# Patient Record
Sex: FEMALE
[De-identification: names, ages, dates, MRNs, and addresses within clinical notes are randomized per-mention and may not be internally consistent; named-entity substitution may affect disease eponyms.]

---

## 2018-12-26 PROBLEM — Z00.00 ENCOUNTER FOR PREVENTIVE HEALTH EXAMINATION: Status: ACTIVE | Noted: 2018-12-26

## 2019-01-15 ENCOUNTER — APPOINTMENT (OUTPATIENT)
Dept: PULMONOLOGY | Facility: CLINIC | Age: 54
End: 2019-01-15
Payer: SELF-PAY

## 2019-01-15 VITALS
HEART RATE: 78 BPM | DIASTOLIC BLOOD PRESSURE: 70 MMHG | BODY MASS INDEX: 25.27 KG/M2 | SYSTOLIC BLOOD PRESSURE: 120 MMHG | WEIGHT: 148 LBS | OXYGEN SATURATION: 98 % | HEIGHT: 64 IN

## 2019-01-15 DIAGNOSIS — C78.00 SECONDARY MALIGNANT NEOPLASM OF UNSPECIFIED LUNG: ICD-10-CM

## 2019-01-15 PROCEDURE — 99203 OFFICE O/P NEW LOW 30 MIN: CPT

## 2019-01-15 NOTE — HISTORY OF PRESENT ILLNESS
[FreeTextEntry1] : Ms Milvia Singh is a 53 year old woman who has metastatic rectal cancer.  She has several lung nodules that were active on PET scan and have increased in size despite systemic chemotherapy.  She presented for advice on management of lung  nodules and her treatment in general.  She is under the care of an oncologist at [facility] and is considering into a clinical trail.  She has no dyspnea, cough or chest pain.\par \par In 2017 she was found to have an obstructing rectal lesion cw adenocarcinoma.  she underwent resection but postoperative course was remarkable for intestinal leak.  she developed sepsis, acute respiratory failure, ARDS.  She required tracheostomy and was eventually weaned and decannulated.  She has received chemotherapy with Xeliri, bevacizumab.  The lung nodules appeared to increase in size. , about 6 mm.

## 2019-01-15 NOTE — DISCUSSION/SUMMARY
[FreeTextEntry1] : Rectal cancer with metastases to lung, liver bladder.\par \par She requires systemic treatment with chemotherapy and  or immunotherapy.  She will follow with her oncologist at [facility].\par \par She presented with her daughters and sister.\par \par I answered several questions regarding potential treatment approaches\par \par I concur with systemic treatment of her condition\par \par CT and PET scan should be followed\par \par i reviewed clinical history and radiology reports \par \par Kit Guzman MD [facility]  No pertinent past medical history

## 2019-01-15 NOTE — PHYSICAL EXAM
[General Appearance - Well Nourished] : well nourished [General Appearance - In No Acute Distress] : no acute distress [Normal Oropharynx] : normal oropharynx [Low Lying Soft Palate] : no low lying soft palate [Heart Sounds] : normal S1 and S2 [Arterial Pulses Normal] : the arterial pulses were normal [Respiration, Rhythm And Depth] : normal respiratory rhythm and effort [Exaggerated Use Of Accessory Muscles For Inspiration] : no accessory muscle use [Auscultation Breath Sounds / Voice Sounds] : lungs were clear to auscultation bilaterally [Bowel Sounds] : normal bowel sounds [Abdomen Soft] : soft [Abdomen Tenderness] : non-tender [] : no hepato-splenomegaly [Nail Clubbing] : no clubbing of the fingernails

## 2019-01-28 ENCOUNTER — APPOINTMENT (OUTPATIENT)
Dept: SURGERY | Facility: CLINIC | Age: 54
End: 2019-01-28
Payer: MEDICAID

## 2019-01-28 VITALS
OXYGEN SATURATION: 99 % | BODY MASS INDEX: 23.32 KG/M2 | WEIGHT: 140 LBS | HEART RATE: 68 BPM | HEIGHT: 64.96 IN | SYSTOLIC BLOOD PRESSURE: 110 MMHG | DIASTOLIC BLOOD PRESSURE: 75 MMHG | TEMPERATURE: 98.7 F | RESPIRATION RATE: 16 BRPM

## 2019-01-28 DIAGNOSIS — C78.00 MALIGNANT NEOPLASM OF RECTUM: ICD-10-CM

## 2019-01-28 DIAGNOSIS — Z87.42 PERSONAL HISTORY OF OTHER DISEASES OF THE FEMALE GENITAL TRACT: ICD-10-CM

## 2019-01-28 DIAGNOSIS — Z87.19 PERSONAL HISTORY OF OTHER DISEASES OF THE DIGESTIVE SYSTEM: ICD-10-CM

## 2019-01-28 DIAGNOSIS — Z87.09 PERSONAL HISTORY OF OTHER DISEASES OF THE RESPIRATORY SYSTEM: ICD-10-CM

## 2019-01-28 DIAGNOSIS — Z93.3 COLOSTOMY STATUS: ICD-10-CM

## 2019-01-28 DIAGNOSIS — F17.200 NICOTINE DEPENDENCE, UNSPECIFIED, UNCOMPLICATED: ICD-10-CM

## 2019-01-28 DIAGNOSIS — N13.30 UNSPECIFIED HYDRONEPHROSIS: ICD-10-CM

## 2019-01-28 DIAGNOSIS — Z87.01 PERSONAL HISTORY OF PNEUMONIA (RECURRENT): ICD-10-CM

## 2019-01-28 DIAGNOSIS — K94.19 OTHER COMPLICATIONS OF ENTEROSTOMY: ICD-10-CM

## 2019-01-28 DIAGNOSIS — C20 MALIGNANT NEOPLASM OF RECTUM: ICD-10-CM

## 2019-01-28 PROCEDURE — 99205 OFFICE O/P NEW HI 60 MIN: CPT

## 2019-01-28 RX ORDER — THYROID 90 MG/1
TABLET ORAL
Refills: 0 | Status: ACTIVE | COMMUNITY

## 2019-02-03 PROBLEM — K94.19: Status: ACTIVE | Noted: 2019-02-03

## 2019-02-03 PROBLEM — C20 RECTAL CANCER METASTASIZED TO LUNG: Status: ACTIVE | Noted: 2019-02-03

## 2019-02-03 PROBLEM — N13.30 HYDRONEPHROSIS, RIGHT: Status: ACTIVE | Noted: 2019-02-03

## 2019-02-03 PROBLEM — Z93.3 COLOSTOMY IN PLACE: Status: ACTIVE | Noted: 2019-01-28

## 2019-02-03 NOTE — PHYSICAL EXAM
[FreeTextEntry1] : This is a 53 year-old well-developed female in no apparent distress.\par \par HEENT normocephalic, anicteric, external ears normal bilaterally, EOMs intact.\par \par Lungs - clear to auscultation bilaterally\par \par Cardiac - regular rate and rhythm.\par \par Abdomen soft, nontender, nondistended. RLQ diverting loop ileostomy with prolapse of both limbs, proximal limb with 4 cm prolapse, pt reports prolapse gets significantly longer at times. LLQ colostomy in place, not functional, flat against the skin. Pt is wearing stoma appliances at both sides. No hepatosplenomegaly.\par \par No inguinal lymphadenopathy bilaterally.\par \par Neuro-cranial nerves grossly intact. Normal gait.\par \par Psychiatric-oriented to time place and person. Good understanding of conversation.\par

## 2019-02-03 NOTE — ASSESSMENT
[FreeTextEntry1] : 52 yo female with a prolapsing diverting loop ileostomy and an end colostomy. Given the significant prolapse of the diverting loop ileostomy, it is reasonable to reverse it. I discussed the details, risks, benefits and alternatives of the procedure and expectations of recovery. I discussed she would need to wait 4 weeks postop prior to starting chemotherapy, as long as there were no complications. \par The pt has not had an evaluation of her colon since her LAR in 2017. Furthermore, it appears she has never had a full colonoscopy, as the reports state the rectal Ca could not be traversed with the scope at the time of her dx. She will need to have evaluation of her colon prior to reversing the diverting loop ileostomy, to ensure there are no distal strictures and no colon lesions. Unfortunately prepping the diverted colon is challenging. One option is  a barium enema with fleet enema prep via the colostomy. The barium enema would be limited and target large lesions. Another option would be to admit the pt and perform bowel prep via a catheter in the distal limb of the diverting loop ileostomy, then perform a colonoscopy prior to the ileostomy reversal. \par The pt inquired about reversal of the colostomy. I do not feel this will ever be possible, due to a very low rectal stump and a hostile pelvis. To elaborate, the surgeon's report states she had an anastomosis at 3 cm from the dentate line. 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 It is also noteworthy that this pt did not receive the "standard" tx for locally advanced rectal Ca, which would have been neoadjuvant chemoRT, then resection and then further chemotherapy (a diverting colostomy could have been done pre-treatment if there was concern for clinical obstruction). She never received pelvic radiation. Therefore, I am concerned about the possibility of local pelvic recurrence - this may already be present in her pelvis but the imaging and w/u so far have not confirmed it. This is another reason however that I would not recommend colostomy reversal. \par I discussed that the timing of the ileostomy reversal would depend on her need for chemotherapy. I will reach out to the Medical Oncologist, Dr Sher, to discuss. If she feels the pt can wait to restart chemo until after recovery from the reversal, I will schedule the pt for surgery asap.\par The pt is satisfied with this plan. I will call her after I discuss her case with Dr Sher.

## 2019-02-03 NOTE — HISTORY OF PRESENT ILLNESS
[FreeTextEntry1] : Milvia is a 54 y/o female here for a consultation visit for ostomy reversal. \par Patient lives in [facility] and has received all her tx so far there. Has hx of rectal adenocarcinoma, diagnosed on colonoscopy in 2017, at the time the lesion was near obstructing and could not be traversed with the scope. Pt underwent LAR + MARGARITO+BSO in March 2017, path was T3N1 1/22) adenocarcinoma of the rectum. The uterus had been fused to the rectum at the area of the tumor, but Path was read as endometriosis. \par Postoperatively the pt developed an anastomotic leak, initially treated with a diverting loop ileostomy. The pt remained septic and underwent 2nd emergency exploration and creation of a left colostomy. Developed Acinetobacter pneumonia and ARDS. Eventually she recovered and her presumed lung metastasis has been treated with 8 cycles of XELIRI chemo and Avastin. F/u imaging showed a right sided hydronephrosis d/t ureter stricture at the level of the pelvis and a 6 cm fluid collection, this was treated with a pigtail catheter, aspiration of the fluid did not show malignancy. However, the fluid collection was positive on f/u PET. \par She has been off chemo since October 2018. She came to [place] for further evaluation and tx (pt was born here). CT at [facility] few days ago showed the R-sided pigtail catheter with resolution of the hydrohephrosis, no obvious abdominal or pelvic mets and lung mets, with cavitating lesions and lesions new or increasing in size. She states she is seeing Thoracic Surgery next for further evaluation of her lung mets. \par She reports intermittent prolapse of her diverting loop ileostomy, both limbs, resulting in great difficulty with use of the appliance. She has no output from her colostomy, but she continues to place an appliance over it for fear of "infection". I instructed her she does not need to place an appliance over the colostomy and can simply place a large band-aid over it.

## 2019-02-03 NOTE — CONSULT LETTER
[Dear  ___] : Dear  [unfilled], [Consult Letter:] : I had the pleasure of evaluating your patient, [unfilled]. [Please see my note below.] : Please see my note below. [Consult Closing:] : Thank you very much for allowing me to participate in the care of this patient.  If you have any questions, please do not hesitate to contact me. [Sincerely,] : Sincerely, [DrLotus  ___] : Dr. FERNANDO [FreeTextEntry2] : Dr Kit Guzman [FreeTextEntry3] : Lisa Mccoy M.D., F.A.C.S., F.CARROLL.S.C.R.S.\par Assistant Professor of Surgery\par [employer] [facility]\par \par